# Patient Record
Sex: MALE | Race: WHITE | NOT HISPANIC OR LATINO | Employment: OTHER | URBAN - NONMETROPOLITAN AREA
[De-identification: names, ages, dates, MRNs, and addresses within clinical notes are randomized per-mention and may not be internally consistent; named-entity substitution may affect disease eponyms.]

---

## 2022-12-29 ENCOUNTER — APPOINTMENT (OUTPATIENT)
Dept: RADIOLOGY | Facility: CLINIC | Age: 52
End: 2022-12-29

## 2022-12-29 ENCOUNTER — OFFICE VISIT (OUTPATIENT)
Dept: URGENT CARE | Facility: CLINIC | Age: 52
End: 2022-12-29

## 2022-12-29 VITALS
SYSTOLIC BLOOD PRESSURE: 135 MMHG | WEIGHT: 150 LBS | DIASTOLIC BLOOD PRESSURE: 81 MMHG | HEART RATE: 79 BPM | RESPIRATION RATE: 20 BRPM | OXYGEN SATURATION: 97 % | TEMPERATURE: 98.8 F

## 2022-12-29 DIAGNOSIS — M75.02 ADHESIVE CAPSULITIS OF LEFT SHOULDER: Primary | ICD-10-CM

## 2022-12-29 DIAGNOSIS — M25.512 PAIN AND SWELLING OF LEFT SHOULDER: ICD-10-CM

## 2022-12-29 DIAGNOSIS — M25.412 PAIN AND SWELLING OF LEFT SHOULDER: ICD-10-CM

## 2022-12-29 RX ORDER — PREDNISONE 10 MG/1
TABLET ORAL
Qty: 20 TABLET | Refills: 0 | Status: SHIPPED | OUTPATIENT
Start: 2022-12-29

## 2022-12-29 NOTE — PROGRESS NOTES
[facility]        NAME: Rebekah Grace is a 50 y o  male  : 1970    MRN: [id number]  DATE: 2022  TIME: 1:37 PM    Assessment and Plan   Adhesive capsulitis of left shoulder [M75 02]  1  Adhesive capsulitis of left shoulder  XR shoulder 2+ vw left    predniSONE 10 mg tablet            Patient Instructions   Patient Instructions     Adhesive Capsulitis   WHAT YOU NEED TO KNOW:   Adhesive capsulitis happens when tissues in your shoulder tighten and swell  The condition is often called frozen shoulder because the swollen tissues cause pain and decrease your shoulder movement  DISCHARGE INSTRUCTIONS:   Return to the emergency department if:   · You have new or increased trouble moving your arm  Contact your healthcare provider if:   · You have worse pain and stiffness in your shoulder  · You have questions or concerns about your condition  Medicines:   · Prescription pain medicine  may be given  Ask your healthcare provider how to take this medicine safely  Some prescription pain medicines contain acetaminophen  Do not take other medicines that contain acetaminophen without talking to your healthcare provider  Too much acetaminophen may cause liver damage  Prescription pain medicine may cause constipation  Ask your healthcare provider how to prevent or treat constipation  · NSAIDs  help decrease swelling and pain or fever  This medicine is available with or without a doctor's order  NSAIDs can cause stomach bleeding or kidney problems in certain people  If you take blood thinner medicine, always ask your healthcare provider if NSAIDs are safe for you  Always read the medicine label and follow directions  · Take your medicine as directed  Contact your healthcare provider if you think your medicine is not helping or if you have side effects  Tell him of her if you are allergic to any medicine  Keep a list of the medicines, vitamins, and herbs you take   Include the amounts, and when and why you take them  Bring the list or the pill bottles to follow-up visits  Carry your medicine list with you in case of an emergency  Physical therapy:  A physical therapist teaches you exercises to help improve movement and strength, and to decrease pain  Stretches to do at home:   · Doorway stretch:   a doorway with your painful arm bent at the elbow  Place your hand on the door frame and turn your body away from the door frame  Hold this position for 30 seconds  Relax and repeat  · Forward stretch:  Lie on your back with your legs straight out  Use your healthy arm to push your painful arm up over your head until you feel a gentle stretch  Hold this position for 15 seconds  Slowly lower your arm to the starting position  Relax and repeat  · Crossover stretch:  Use your healthy arm to gently pull your painful arm across your chest just below your chin  Pull until you feel a gentle stretch  Hold this position for 30 seconds  Relax and repeat  Apply ice as directed:  Ice helps decrease pain and swelling  Apply ice to help ease pain after stretching  Use an ice pack, or put crushed ice in a plastic bag  Cover it with a towel before you apply it to your shoulder  Apply ice for 15 to 20 minutes every hour, or as directed  Apply heat as directed:  Heat helps relax muscles and may help improve shoulder movement  Use a heat pack, or soak a small towel in warm water  Wring out the extra water before you apply the towel to your shoulder  Apply heat for 20 to 30 minutes every hour, or as directed  Follow up with your healthcare provider as directed:  Write down your questions so you remember to ask them during your visits  © Copyright [employer] 2022 Information is for End User's use only and may not be sold, redistributed or otherwise used for commercial purposes   All illustrations and images included in CareNotes® are the copyrighted property of [employer]  or [facility]  The above information is an  only  It is not intended as medical advice for individual conditions or treatments  Talk to your doctor, nurse or pharmacist before following any medical regimen to see if it is safe and effective for you  Follow up with PCP in 3-5 days  Proceed to  ER if symptoms worsen  Chief Complaint     Chief Complaint   Patient presents with   • Shoulder Pain     Left shoulder pain for 1 month  Using Ibu  History of Present Illness       The patient presents to the clinic complaining of pain in the left shoulder for approximately 1 month  He states that he does do heavy lifting for work  He states that he noticed pain in both shoulders approximately 1 month ago after waking up morning from working the night before  He states that the right shoulder has slowly improved but the left shoulder seems to be getting worse  He complains of pain located in the left lateral shoulder that radiates down his left arm  He states the pain seems to be worse with movement, internal rotation, external rotation, and abduction  He has associated decreased range of motion and numbness of his left hand  He states that he has been taking ibuprofen over the last month every few days when the pain gets at its worst which does not seem to be helping with his symptoms  He also tried Osteo Bi-Flex for the last few weeks which did not seem to help with his symptoms  He also complains of associated neck pain but denies neck swelling  He denies any specific injury to his neck  Current pain level is 9 out of 10 at rest and 10 out of 10 with movement         Review of Systems   Review of Systems   Constitutional: Negative for chills and fever  HENT: Negative for ear pain and sore throat  Eyes: Negative for pain and visual disturbance  Respiratory: Negative for cough and shortness of breath      Cardiovascular: Negative for chest pain and palpitations  Gastrointestinal: Negative for abdominal pain and vomiting  Genitourinary: Negative for dysuria and hematuria  Musculoskeletal: Positive for joint swelling  Negative for arthralgias and back pain  Skin: Negative for color change and rash  Neurological: Negative for seizures and syncope  All other systems reviewed and are negative  Current Medications       Current Outpatient Medications:   •  predniSONE 10 mg tablet, 4 po for 2 days, then 3x2, 2x2, and 1x2, Disp: 20 tablet, Rfl: 0    Current Allergies     Allergies as of 12/29/2022   • (No Known Allergies)            The following portions of the patient's history were reviewed and updated as appropriate: allergies, current medications, past family history, past medical history, past social history, past surgical history and problem list      Past Medical History:   Diagnosis Date   • WPW (Lacho-Parkinson-White syndrome)        History reviewed  No pertinent surgical history  History reviewed  No pertinent family history  Medications have been verified  Objective   /81   Pulse 79   Temp 98 8 °F (37 1 °C)   Resp 20   Wt 68 kg (150 lb)   SpO2 97%        Physical Exam     Physical Exam  Constitutional:       Appearance: Normal appearance  Musculoskeletal:      Left shoulder: Swelling, tenderness, bony tenderness and crepitus present  Decreased range of motion  Decreased strength  Normal pulse  Right upper arm: Normal       Left upper arm: No swelling, edema, deformity or lacerations  Right elbow: Normal       Left elbow: Normal       Left forearm: Normal       Right wrist: Normal       Left wrist: Normal         Arms:       Cervical back: No swelling, deformity, signs of trauma, spasms, tenderness or crepitus  Normal range of motion  Comments: - There is tenderness noted in the left lateral shoulder    He has decreased range of motion to abduction, internal rotation, external rotation, flexion, and extension of the left shoulder   -There is decreased strength noted on the left upper extremity  Neurological:      Mental Status: He is alert  -X-ray was reviewed  This shows no acute findings  He currently does not have any insurance therefore would like to hold off on orthopedic consult and physical therapy at this time  I did give him exercises to try at home  I will treat him with prednisone  He will follow-up with his PCP or go to the ER symptoms worsen

## 2022-12-29 NOTE — PATIENT INSTRUCTIONS
Adhesive Capsulitis   WHAT YOU NEED TO KNOW:   Adhesive capsulitis happens when tissues in your shoulder tighten and swell  The condition is often called frozen shoulder because the swollen tissues cause pain and decrease your shoulder movement  DISCHARGE INSTRUCTIONS:   Return to the emergency department if:   You have new or increased trouble moving your arm  Contact your healthcare provider if:   You have worse pain and stiffness in your shoulder  You have questions or concerns about your condition  Medicines:   Prescription pain medicine  may be given  Ask your healthcare provider how to take this medicine safely  Some prescription pain medicines contain acetaminophen  Do not take other medicines that contain acetaminophen without talking to your healthcare provider  Too much acetaminophen may cause liver damage  Prescription pain medicine may cause constipation  Ask your healthcare provider how to prevent or treat constipation  NSAIDs  help decrease swelling and pain or fever  This medicine is available with or without a doctor's order  NSAIDs can cause stomach bleeding or kidney problems in certain people  If you take blood thinner medicine, always ask your healthcare provider if NSAIDs are safe for you  Always read the medicine label and follow directions  Take your medicine as directed  Contact your healthcare provider if you think your medicine is not helping or if you have side effects  Tell him of her if you are allergic to any medicine  Keep a list of the medicines, vitamins, and herbs you take  Include the amounts, and when and why you take them  Bring the list or the pill bottles to follow-up visits  Carry your medicine list with you in case of an emergency  Physical therapy:  A physical therapist teaches you exercises to help improve movement and strength, and to decrease pain     Stretches to do at home:   Doorway stretch:   a doorway with your painful arm bent at the elbow  Place your hand on the door frame and turn your body away from the door frame  Hold this position for 30 seconds  Relax and repeat  Forward stretch:  Lie on your back with your legs straight out  Use your healthy arm to push your painful arm up over your head until you feel a gentle stretch  Hold this position for 15 seconds  Slowly lower your arm to the starting position  Relax and repeat  Crossover stretch:  Use your healthy arm to gently pull your painful arm across your chest just below your chin  Pull until you feel a gentle stretch  Hold this position for 30 seconds  Relax and repeat  Apply ice as directed:  Ice helps decrease pain and swelling  Apply ice to help ease pain after stretching  Use an ice pack, or put crushed ice in a plastic bag  Cover it with a towel before you apply it to your shoulder  Apply ice for 15 to 20 minutes every hour, or as directed  Apply heat as directed:  Heat helps relax muscles and may help improve shoulder movement  Use a heat pack, or soak a small towel in warm water  Wring out the extra water before you apply the towel to your shoulder  Apply heat for 20 to 30 minutes every hour, or as directed  Follow up with your healthcare provider as directed:  Write down your questions so you remember to ask them during your visits  © Copyright [employer] 2022 Information is for End User's use only and may not be sold, redistributed or otherwise used for commercial purposes  All illustrations and images included in CareNotes® are the copyrighted property of [employer]  or [employer]  The above information is an  only  It is not intended as medical advice for individual conditions or treatments  Talk to your doctor, nurse or pharmacist before following any medical regimen to see if it is safe and effective for you